# Patient Record
Sex: MALE | Race: BLACK OR AFRICAN AMERICAN | NOT HISPANIC OR LATINO | URBAN - METROPOLITAN AREA
[De-identification: names, ages, dates, MRNs, and addresses within clinical notes are randomized per-mention and may not be internally consistent; named-entity substitution may affect disease eponyms.]

---

## 2024-08-12 NOTE — ASU PATIENT PROFILE, ADULT - AS [facility] [employer]
.No issues with last injections  No new meds or eye drops  No wheezing or inhaler use for 48 hours     Vial #1D    set 1 of  2  RED 1:1 GR/TR/W. Expiration 12/17/2020. Administered 0.5ml in upper left arm.        Tolerated well   Pt instructed to remain in clinic for 30 minutes to monitor for rxn  Verbalized understanding          (4) no limitation

## 2024-08-13 ENCOUNTER — OUTPATIENT (OUTPATIENT)
Dept: OUTPATIENT SERVICES | Facility: HOSPITAL | Age: 54
LOS: 1 days | Discharge: HOME | End: 2024-08-13

## 2024-08-13 VITALS
HEART RATE: 58 BPM | SYSTOLIC BLOOD PRESSURE: 137 MMHG | WEIGHT: 146.17 LBS | RESPIRATION RATE: 16 BRPM | OXYGEN SATURATION: 100 % | TEMPERATURE: 98 F | HEIGHT: 68 IN | DIASTOLIC BLOOD PRESSURE: 82 MMHG

## 2024-08-13 VITALS
HEART RATE: 65 BPM | RESPIRATION RATE: 12 BRPM | SYSTOLIC BLOOD PRESSURE: 147 MMHG | OXYGEN SATURATION: 100 % | DIASTOLIC BLOOD PRESSURE: 76 MMHG

## 2024-08-13 DIAGNOSIS — Z98.890 OTHER SPECIFIED POSTPROCEDURAL STATES: Chronic | ICD-10-CM

## 2024-08-13 DEVICE — IMPLANTABLE DEVICE: Type: IMPLANTABLE DEVICE | Site: RIGHT | Status: FUNCTIONAL

## 2024-08-13 RX ORDER — ATROPINE SULFATE 1 %
1 DROPS OPHTHALMIC (EYE)
Refills: 0 | Status: COMPLETED | OUTPATIENT
Start: 2024-08-13 | End: 2024-08-13

## 2024-08-13 RX ORDER — OXYCODONE HYDROCHLORIDE 30 MG/1
5 TABLET ORAL ONCE
Refills: 0 | Status: DISCONTINUED | OUTPATIENT
Start: 2024-08-13 | End: 2024-08-13

## 2024-08-13 RX ORDER — FENTANYL CITRATE 1200 UG/1
25 LOZENGE ORAL; TRANSMUCOSAL
Refills: 0 | Status: DISCONTINUED | OUTPATIENT
Start: 2024-08-13 | End: 2024-08-13

## 2024-08-13 RX ORDER — TROPICAMIDE 1 %
1 DROPS OPHTHALMIC (EYE)
Refills: 0 | Status: COMPLETED | OUTPATIENT
Start: 2024-08-13 | End: 2024-08-13

## 2024-08-13 RX ORDER — DEXTROSE MONOHYDRATE, SODIUM CHLORIDE, SODIUM LACTATE, CALCIUM CHLORIDE, MAGNESIUM CHLORIDE 1.5; 538; 448; 18.4; 5.08 G/100ML; MG/100ML; MG/100ML; MG/100ML; MG/100ML
1000 SOLUTION INTRAPERITONEAL
Refills: 0 | Status: DISCONTINUED | OUTPATIENT
Start: 2024-08-13 | End: 2024-08-13

## 2024-08-13 RX ORDER — ONDANSETRON HCL/PF 4 MG/2 ML
4 VIAL (ML) INJECTION ONCE
Refills: 0 | Status: DISCONTINUED | OUTPATIENT
Start: 2024-08-13 | End: 2024-08-13

## 2024-08-13 RX ORDER — PHENYLEPHRINE HYDROCHLORIDE 25 MG/ML
1 SOLUTION/ DROPS OPHTHALMIC
Refills: 0 | Status: COMPLETED | OUTPATIENT
Start: 2024-08-13 | End: 2024-08-13

## 2024-08-13 RX ADMIN — Medication 1 DROP(S): at 15:43

## 2024-08-13 RX ADMIN — Medication 1 DROP(S): at 15:52

## 2024-08-13 RX ADMIN — PHENYLEPHRINE HYDROCHLORIDE 1 DROP(S): 25 SOLUTION/ DROPS OPHTHALMIC at 15:48

## 2024-08-13 RX ADMIN — PHENYLEPHRINE HYDROCHLORIDE 1 DROP(S): 25 SOLUTION/ DROPS OPHTHALMIC at 15:52

## 2024-08-13 RX ADMIN — Medication 1 DROP(S): at 15:48

## 2024-08-13 RX ADMIN — Medication 1 DROP(S): at 15:53

## 2024-08-13 RX ADMIN — PHENYLEPHRINE HYDROCHLORIDE 1 DROP(S): 25 SOLUTION/ DROPS OPHTHALMIC at 15:43

## 2024-08-13 NOTE — OPERATIVE REPORT - OPERATIVE RPOSRT DETAILS
PRIMARY SCLERAL BUCKLE      PATIENT NAME: VIRIDIANA TAYLOR    ATTENDING: Joseph Duggan MD PhD    PREOPERATIVE DIAGNOSIS(ES):  Rhegmatogenous Retinal Detachment, right eye     POSTOPERATIVE DIAGNOSIS(ES): Rhegmatogenous Retinal Detachment, right eye     PROCEDURE: Segmental scleral buckle, cryoretinopexy, all of the right eye      INDICATIONS:       The patient is a 54y year-old Male with a history of a retinal detachment in the operative eye. After a detailed review of the risks, benefits and alternatives of the procedure, including but not limited to bleeding, infection, inflammation, retinal detachment, loss of vision, loss of eye, double vision, unclear visual potential, need for further surgery, and systemic risks of anesthesia and surgery, informed consent was obtained and the patient elected to proceed with the surgery.      PROCEDURE:       On the day of surgery, after clearance by medicine and anesthesia, the patient was brought to the operating room in stable condition. After verifying the correct surgical site, the patient was placed in the supine position. General anesthesia was provided by the anesthesia team.  The patient was then prepped and draped in the usual sterile fashion.  Eyelashes were draped out of the operative field and a stainless steel eyelid speculum was placed in the operative eye.  A time-out was performed verifying correct patient, procedure, site, positioning and special equipment prior to starting the case.     A superotemporal quadrant conjunctival peritomy was created using blunt Felice scissors and a 0.12 forceps approximately 4 mm posterior to the limbus. Curved Michael’s tenotomy scissors were then used to enter Tenon’s space. A 1:1 mixture of 2% lidocaine and 0.75% Marcaine was injected in sub Tenon's fashion. Using a 2-0 silk passed through a Angela retinal detachment hook, the lateral rectus muscles was isolated and tied. The quadrant was then inspected for signs of scleral thinning.      The retinal break which had been noted pre-operatively was then directly visualized and marked using a scleral marker and a surgical pen using the indirect ophthalmoscope and 2.2 lens. After confirmation of these marks, the cryoprobe was then brought onto the operative field and a contiguous area of cryotherapy was applied to the edges of the breaks under direct visualization.      2 5-0 nylon mattress sutures on a spatulated needle was used to place one-half thickness intrascleral bites in a horizontal mattress fashion of the scleral buckle 8 mm apart straddling the previously marked retinal breaks. A #506 silicone element, which had previously been soaking in antibiotic solution, was then brought onto the operative field and threaded through each of the pre-placed nylon mattress sutures. The mattress sutures were then tightened temporarily using temporary knots to secure the elements to the globe. An anterior chamber paracentesis was performed using a 30-gauge needle on a tuberculin syringe to bring the eye to an acceptable physiologic pressure.      Under direct visualization using the indirect ophthalmoscope and the 2.2 lens, the central retinal artery was confirmed to be well perfused. The scleral buckle indentation was confirmed to be in the correct position and at an appropriate height. The scleral mattress sutures were then permanently tied and the knots were rotated posteriorly. The edges of the scleral buckle element were trimmed to minimize risk of exposure.       Tenon’s capsule was closed over the scleral buckle element using 8-0 vicryl in an interrupted fashion. The overlying conjunctiva was then closed using running and interrupted 8-0 vicryl sutures.  The eye remained at a physiologic pressure by palpation.  Steroid and antibiotic were injected subconjunctivally.    The eyelid speculum was removed from the eye. Betadine was cleaned from around the eye. A topical steroid/antibiotic cream was placed on the eye, followed by a patch and a clear plastic shield. The patient tolerated the procedure well and left the operating room in stable condition after reversal of general anesthesia.

## (undated) DEVICE — CRYO PROBE 2.5MM

## (undated) DEVICE — DRSG TAPE MICROPORE SURGICAL TAPE .5"X10 YDS TAN

## (undated) DEVICE — GLV 7.5 PROTEXIS (WHITE)

## (undated) DEVICE — PACK VITRECTOMY  LF

## (undated) DEVICE — SUT SILK 3-0 18" TIES

## (undated) DEVICE — SUT SILK 4-0 24" RB-1

## (undated) DEVICE — SUT POLYSORB 8-0 5" MV-135-5

## (undated) DEVICE — WARMING BLANKET LOWER ADULT

## (undated) DEVICE — Device

## (undated) DEVICE — VENODYNE/SCD SLEEVE CALF MEDIUM

## (undated) DEVICE — SUT NYLON 5-0 18" R-5

## (undated) DEVICE — DRAPE MICROSCOPE KNOB COVER SMALL (2 PCS)

## (undated) DEVICE — SUT VICRYL 8-0 12" TG140-8 DA